# Patient Record
Sex: MALE | Race: WHITE | URBAN - METROPOLITAN AREA
[De-identification: names, ages, dates, MRNs, and addresses within clinical notes are randomized per-mention and may not be internally consistent; named-entity substitution may affect disease eponyms.]

---

## 2022-04-26 ENCOUNTER — OFFICE VISIT (OUTPATIENT)
Dept: SURGERY | Facility: CLINIC | Age: 67
End: 2022-04-26
Payer: COMMERCIAL

## 2022-04-26 DIAGNOSIS — R35.0 URINARY FREQUENCY: Primary | ICD-10-CM

## 2022-04-26 DIAGNOSIS — Z87.891 HISTORY OF TOBACCO USE: ICD-10-CM

## 2022-04-26 DIAGNOSIS — Z87.442 HISTORY OF NEPHROLITHIASIS: ICD-10-CM

## 2022-04-26 LAB
APPEARANCE: CLEAR
BILIRUBIN: NEGATIVE
GLUCOSE (URINE DIPSTICK): NEGATIVE MG/DL
KETONES (URINE DIPSTICK): NEGATIVE MG/DL
LEUKOCYTES: NEGATIVE
MULTISTIX LOT#: NORMAL NUMERIC
NITRITE, URINE: NEGATIVE
OCCULT BLOOD: NEGATIVE
PH, URINE: 6 (ref 4.5–8)
PROTEIN (URINE DIPSTICK): NEGATIVE MG/DL
SPECIFIC GRAVITY: 1.02 (ref 1–1.03)
URINE-COLOR: YELLOW
UROBILINOGEN,SEMI-QN: 0.2 MG/DL (ref 0–1.9)

## 2022-04-26 PROCEDURE — 81003 URINALYSIS AUTO W/O SCOPE: CPT | Performed by: PHYSICIAN ASSISTANT

## 2022-04-26 PROCEDURE — 99243 OFF/OP CNSLTJ NEW/EST LOW 30: CPT | Performed by: PHYSICIAN ASSISTANT

## 2022-04-26 RX ORDER — AMOXICILLIN AND CLAVULANATE POTASSIUM 500; 125 MG/1; MG/1
1 TABLET, FILM COATED ORAL EVERY 8 HOURS
COMMUNITY
Start: 2022-03-16

## 2022-04-26 RX ORDER — CHLORHEXIDINE GLUCONATE 0.12 MG/ML
RINSE ORAL
COMMUNITY
Start: 2022-04-19

## 2022-04-26 RX ORDER — IBUPROFEN 800 MG/1
800 TABLET ORAL EVERY 6 HOURS
COMMUNITY
Start: 2022-03-16

## 2022-04-26 NOTE — PROGRESS NOTES
[address] [place], [address] [facility]    Urology Consult Note    History of Present Illness:   Patient is a 78year old male with no known significant medical history who presents today for consultation from Dr. Milagro Garza office for nocturia. Patient was seen 12/29/21 by Dr. Alana Escobar for same complaint. He was started on Tamsulosin with no change in symptoms after taking for one week and he discontinued. Ongoing x 4 years, no significant change. Denies UTI, prostatitis. No dysuria or gross hematuria. Nocturia: 5-6 times per night and typically subjective small voids  Diuretic use: none  NSAID use: taking only currently for dental procedure recently, but not normally  Exercise: daily, runs 5-6 miles  Fluids prior to bedtime: around 8pm tries to decrease, and bed around 10-11  Drinks ~ 1.5 liters water per day. No caffeine  No LE edema    Daytime frequency every 1.5-2 hours. Has occasional urgency during the day but denies any urge incontinence. Has limited fluids prior to be with no improvement. Does not drink any caffeine after noon. He does not snore. Kidney stone hx: 6 years ago on the left side - no surgery, passed spontaneously. No renal colic symptoms. Had been seen by urologist in [place] and was recommended he take a medication for \"prostate adenoma\" but he deferred to desire to limit medications unless absolutely necessary. Had PSA screening a few months ago with PCP which per patient report was normal.     Former smoker, 10-15 years, 1 ppd. No FH of  cancers. +FH of stones. UA today negative. PVR 31mL    HISTORY:  No past medical history on file. No past surgical history on file. No family history on file.    Social History: Social History    Tobacco Use      Smoking status: Not on file      Smokeless tobacco: Not on file    Alcohol use: Not on file    Drug use: Not on file       Allergies  No Known Allergies    Review of Systems:   A 10-point review of systems was completed and is negative other than as noted above. Physical Exam:   There were no vitals taken for this visit. GENERAL APPEARANCE: well developed, well nourished, in no acute distress  NEUROLOGIC: no localizing neurologic signs, alert and oriented x 3, converses appropriately  HEAD: atraumatic, normocephalic  EYES: sclera non-icteric  ORAL CAVITY: mucosa moist  NECK/THYROID: no obvious masses or goiter  LUNGS: non-labored breathing  ABDOMEN: soft, nontender, nondistended  CVA: no CVA tenderness  INGUINAL CANALS: no hernias  PENILE MEATUS: open and in normal location  PENIS normal  SCROTUM: normal  no varicocele  TESTES: normal anatomy  EPIDIDYMIS: normal anatomy  VIKY 25g smooth symmetric without nodule or induration  EXTREMITIES: warm, well-perfused. No clubbing, cyanosis or edema. SKIN: no obvious rashes    Results:     Laboratory Data:  No results found for: WBC, HGB, PLT  No results found for: NA, K, CL, CO2, BUN, GLU, GFRAA, AST, ALT, TP, ALB, PHOS, CA, MG    Urinalysis Results (last three years):  Recent Labs     04/26/22  1520   SPECGRAVITY 1.025   PHURINE 6.0   NITRITE Negative       Urine Culture Results (last three years):  No results found for: URINECUL    Imaging  No results found. Impression:     Patient is a 78year old healthy male who presents today for four year history of urinary frequency, most bothered by nocturia. Reviewed with patient and son that his prostate is not significant enlarged on examination although still could be contributing. For nocturia, I'd recommend the patient drink plenty of fluids first thing in the morning and avoid drinking anything at least 2-3 hours prior to bedtime. Encourage to continue his active lifestyle with regular sleep schedule. We reviewed other behavioral modifications also including avoiding bladder irritants, handout provided. Discussed the possibility and mechanism of nocturnal polyuria contributing to nocturia.     Remote history of stones in which he was able to spontaneously pass. No current renal colic symptoms. Encouraged to continue his hydration to prevent recurrence. Recommendations:  Send urine for cytology today  Bladder diary x 1 day  Renal ultrasound  Consideration for cystoscopy pending results. Thank you very much for this consult. Please call if there are any questions or concerns.      Kira Gan PA-C  Urology  [facility]    Date: 4/26/2022

## 2022-05-09 ENCOUNTER — TELEPHONE (OUTPATIENT)
Dept: SURGERY | Facility: CLINIC | Age: 67
End: 2022-05-09

## 2022-05-10 ENCOUNTER — TELEPHONE (OUTPATIENT)
Dept: SURGERY | Facility: CLINIC | Age: 67
End: 2022-05-10

## 2022-05-10 NOTE — TELEPHONE ENCOUNTER
Patient son states patient leaves out of country Saturday 05/14 and were waiting for HANH Ariza to review some test before recommending medication. States he brought chart to office on Friday 05/06/2022.  Please advise

## 2022-05-11 RX ORDER — TROSPIUM CHLORIDE ER 60 MG/1
60 CAPSULE ORAL DAILY
Qty: 90 CAPSULE | Refills: 3 | Status: SHIPPED | OUTPATIENT
Start: 2022-05-11

## 2022-05-11 RX ORDER — TROSPIUM CHLORIDE ER 60 MG/1
60 CAPSULE ORAL DAILY
Qty: 90 CAPSULE | Refills: 3 | Status: SHIPPED | OUTPATIENT
Start: 2022-05-11 | End: 2022-05-11

## 2022-05-11 NOTE — TELEPHONE ENCOUNTER
Bladder diary reviewed  Typical voids during daytime with volumes of 100-250mL. Volumes overnight 250-300mL. Would recommend trial of trospium XR 60mg daily, ASE of constipation or dry mouth. This should help reduce frequency of urination.

## 2023-10-02 ENCOUNTER — HOSPITAL ENCOUNTER (OUTPATIENT)
Dept: CV DIAGNOSTICS | Facility: HOSPITAL | Age: 68
Discharge: HOME | End: 2023-10-02
Attending: INTERNAL MEDICINE
Payer: MEDICAID

## 2023-10-02 DIAGNOSIS — I35.0 AORTIC STENOSIS: ICD-10-CM

## 2023-10-02 PROCEDURE — 93306 TTE W/DOPPLER COMPLETE: CPT | Performed by: INTERNAL MEDICINE

## 2025-06-12 ENCOUNTER — HOSPITAL ENCOUNTER (OUTPATIENT)
Dept: CV DIAGNOSTICS | Facility: HOSPITAL | Age: 70
Discharge: HOME | End: 2025-06-12
Attending: INTERNAL MEDICINE
Payer: MEDICAID

## 2025-06-12 DIAGNOSIS — I35.2 AORTIC HEART VALVE NARROWING WITH INSUFFICIENCY: ICD-10-CM

## 2025-06-12 PROCEDURE — 93306 TTE W/DOPPLER COMPLETE: CPT | Performed by: INTERNAL MEDICINE
